# Patient Record
Sex: FEMALE | Race: BLACK OR AFRICAN AMERICAN | NOT HISPANIC OR LATINO | URBAN - METROPOLITAN AREA
[De-identification: names, ages, dates, MRNs, and addresses within clinical notes are randomized per-mention and may not be internally consistent; named-entity substitution may affect disease eponyms.]

---

## 2017-04-07 ENCOUNTER — HOSPITAL ENCOUNTER (EMERGENCY)
Facility: HOSPITAL | Age: 22
Discharge: HOME | End: 2017-04-07
Attending: EMERGENCY MEDICINE
Payer: MEDICAID

## 2017-04-07 VITALS
DIASTOLIC BLOOD PRESSURE: 69 MMHG | RESPIRATION RATE: 18 BRPM | SYSTOLIC BLOOD PRESSURE: 129 MMHG | OXYGEN SATURATION: 100 % | BODY MASS INDEX: 27.32 KG/M2 | HEART RATE: 93 BPM | HEIGHT: 66 IN | WEIGHT: 170 LBS | TEMPERATURE: 98 F

## 2017-04-07 DIAGNOSIS — M79.673 FOOT PAIN: ICD-10-CM

## 2017-04-07 DIAGNOSIS — S93.601A FOOT SPRAIN, RIGHT, INITIAL ENCOUNTER: Primary | ICD-10-CM

## 2017-04-07 DIAGNOSIS — R55 NEAR SYNCOPE: ICD-10-CM

## 2017-04-07 LAB
ANION GAP SERPL CALC-SCNC: 10 MMOL/L
BASOPHILS # BLD AUTO: 0.01 K/UL
BASOPHILS NFR BLD: 0.1 %
BUN SERPL-MCNC: 12 MG/DL
CALCIUM SERPL-MCNC: 9.6 MG/DL
CHLORIDE SERPL-SCNC: 105 MMOL/L
CO2 SERPL-SCNC: 23 MMOL/L
CREAT SERPL-MCNC: 0.9 MG/DL
DIFFERENTIAL METHOD: ABNORMAL
EOSINOPHIL # BLD AUTO: 0 K/UL
EOSINOPHIL NFR BLD: 0 %
ERYTHROCYTE [DISTWIDTH] IN BLOOD BY AUTOMATED COUNT: 13.2 %
EST. GFR  (AFRICAN AMERICAN): >60 ML/MIN/1.73 M^2
EST. GFR  (NON AFRICAN AMERICAN): >60 ML/MIN/1.73 M^2
GLUCOSE SERPL-MCNC: 127 MG/DL
HCT VFR BLD AUTO: 36.4 %
HGB BLD-MCNC: 12.3 G/DL
LYMPHOCYTES # BLD AUTO: 0.5 K/UL
LYMPHOCYTES NFR BLD: 4 %
MCH RBC QN AUTO: 30.1 PG
MCHC RBC AUTO-ENTMCNC: 33.8 %
MCV RBC AUTO: 89 FL
MONOCYTES # BLD AUTO: 0.5 K/UL
MONOCYTES NFR BLD: 3.6 %
NEUTROPHILS # BLD AUTO: 11.7 K/UL
NEUTROPHILS NFR BLD: 92.3 %
PLATELET # BLD AUTO: 275 K/UL
PMV BLD AUTO: 10.4 FL
POTASSIUM SERPL-SCNC: 4.2 MMOL/L
RBC # BLD AUTO: 4.08 M/UL
SODIUM SERPL-SCNC: 138 MMOL/L
WBC # BLD AUTO: 12.63 K/UL

## 2017-04-07 PROCEDURE — 85025 COMPLETE CBC W/AUTO DIFF WBC: CPT

## 2017-04-07 PROCEDURE — 99284 EMERGENCY DEPT VISIT MOD MDM: CPT

## 2017-04-07 PROCEDURE — 93010 ELECTROCARDIOGRAM REPORT: CPT | Mod: ,,, | Performed by: INTERNAL MEDICINE

## 2017-04-07 PROCEDURE — 80048 BASIC METABOLIC PNL TOTAL CA: CPT

## 2017-04-07 NOTE — ED AVS SNAPSHOT
[facility]  [id number] [place] [id number]               Jose Angel Mccauley   2017  9:07 PM   ED    Description:  Female : 1995   Department:  [facility]           Your Care was Coordinated By:     Provider Role From To    Wilfrid Ivy MD Attending Provider [phone] --      Reason for Visit     Foot Pain           Diagnoses this Visit        Comments    Foot sprain, right, initial encounter    -  Primary     Foot pain         Near syncope           ED Disposition     ED Disposition Condition Comment    Discharge             To Do List           Follow-up Information     Follow up with [facility] In 1 day.    Specialty:  Emergency Medicine    Why:  If symptoms worsen.  Patient should return to the ED for any concerns or worsening of symptoms..     Contact information:    [address] [place] [id number] [phone]      [facility]     [facility] Nurse Care Line - 24/ Assistance  Unless otherwise directed by your provider, please contact [facility], our nurse care line that is available for  assistance.     Registered nurses in the [facility] provide: appointment scheduling, clinical advisement, health education, and other advisory services.  Call: [phone] (toll free)               Medications           Message regarding Medications     Verify the changes and/or additions to your medication regime listed below are the same as discussed with your clinician today.  If any of these changes or additions are incorrect, please notify your healthcare provider.             Verify that the below list of medications is an accurate representation of the medications you are currently taking.  If none reported, the list may be blank. If incorrect, please contact your healthcare provider. Carry this list with you in case of emergency.                Clinical  "Reference Information           Your Vitals Were     BP Pulse Temp Resp Height Weight    129/69 (BP Location: Right arm, Patient Position: Sitting) 93 97.9 °F (36.6 °C) (Oral) 18 5' 6" (1.676 m) 77.1 kg (170 lb)    SpO2 BMI             100% 27.44 kg/m2         Allergies as of 4/7/2017     No Known Allergies      Immunizations Administered on Date of Encounter - 4/7/2017     None      ED Micro, Lab, POCT     Start Ordered       Status Ordering Provider    04/07/17 2114 04/07/17 2115  CBC auto differential  STAT      Final result     04/07/17 2114 04/07/17 2115  Basic metabolic panel  STAT      Final result       ED Imaging Orders     Start Ordered       Status Ordering Provider    04/07/17 2115 04/07/17 2115  X-Ray Foot Complete Right  1 time imaging      Final result       Discharge References/Attachments     FOOT SPRAIN (ENGLISH)      [facility] Sign-Up     Activating your [facility] account is as easy as 1-2-3!     1) Visit [email or web address], select Sign Up Now, enter this activation code and your date of birth, then select Next.  [email or web address]  Expires: 5/22/2017 10:24 PM      2) Create a username and password to use when you visit [facility] in the future and select a security question in case you lose your password and select Next.    3) Enter your e-mail address and click Sign Up!    Additional Information  If you have questions, please e-mail [email or web address] or call [phone] to talk to our [facility] staff. Remember, [facility] is NOT to be used for urgent needs. For medical emergencies, dial 911.         Smoking Cessation     If you would like to quit smoking:   You may be eligible for free services if you are a [place] resident and started smoking cigarettes before September 1, 1988.  Call the [employer]oking [employer]essation [employer] (SCT) toll free at (360) 335-1295 or (701) 103-0263.   Call 3-800-QUIT-NOW if you do not meet the above criteria.   Contact us via email: [email or web address]   View our website " for more information: [email or web address]         [facility] complies with applicable Federal civil rights laws and does not discriminate on the basis of race, color, national origin, age, disability, or sex.        Language Assistance Services     ATTENTION: Language assistance services are available, free of charge. Please call [phone].      ATENCIÓN: Si habla español, tiene a vidales disposición servicios gratuitos de asistencia lingüística. Llame al [phone].     CHÚ Ý: N?u b?n nói Ti?ng Vi?t, có các d?ch v? h? tr? ngôn ng? mi?n phí dành cho b?n. G?i s? [phone].

## 2017-04-08 NOTE — ED PROVIDER NOTES
SCRIBE #1 NOTE: I, Xuan Meka, am scribing for, and in the presence of, Wilfrid Ivy MD. I have scribed the entire note.      History      Chief Complaint   Patient presents with    Foot Pain     martinez foot pain x today.  Pt states fell down 1 step today.  + abrasions to L foot dorsal surface. Pt reports R foot swelling, discomfort.       Review of patient's allergies indicates:  Allergies not on file     HPI   HPI    4/7/2017, 9:10 PM   History obtained from the patient      History of Present Illness: Jose Angel Mccauley is a 22 y.o. female patient who is 4 weeks pregnant presents to the Emergency Department for R foot pain which onset gradually today. Pt reports twisting her ankle and falling. Symptoms are constant and moderate in severity. No mitigating or exacerbating factors reported. Associated sxs include R foot swelling and near syncopal episode after falling. Patient denies any fever, chills, HA, head injury, lightheadedness, vaginal bleeding, R ankle pain, N/V, and all other sxs at this time. No further complaints or concerns at this time. Pt reports having an OB and a scheduled appointment.    Arrival mode: Personal vehicle      PCP: No primary care provider on file.       Past Medical History:  History reviewed. No pertinent past medical history.    Past Surgical History:  No past surgical history on file.      Family History:  History reviewed. No pertinent family history.    Social History:  Social History     Social History Main Topics    Smoking status: Unknown    Smokeless tobacco: Unknown    Alcohol use Unknown    Drug use: Unknown    Sexual activity: Unknown       ROS   Review of Systems   Constitutional: Negative for chills and fever.   HENT: Negative for sore throat.         (-) head injury   Respiratory: Negative for shortness of breath.    Cardiovascular: Negative for chest pain.   Gastrointestinal: Negative for nausea and vomiting.   Genitourinary: Negative for dysuria and vaginal  bleeding.   Musculoskeletal: Negative for back pain.        (+) R foot pain, swelling  (-) R ankle pain   Skin: Negative for rash.   Neurological: Positive for syncope (near). Negative for weakness, light-headedness and headaches.   Hematological: Does not bruise/bleed easily.   All other systems reviewed and are negative.      Physical Exam    Initial Vitals   BP Pulse Resp Temp SpO2   04/07/17 2045 04/07/17 2045 04/07/17 2045 04/07/17 2045 04/07/17 2045   129/69 93 18 97.9 °F (36.6 °C) 100 %      Physical Exam  Nursing Notes and Vital Signs Reviewed.  Constitutional: Patient is in no acute distress. Awake and alert. Well-developed and well-nourished.  Head: Atraumatic. Normocephalic.  Eyes: PERRL. EOM intact. Conjunctivae are not pale. No scleral icterus.  ENT: Mucous membranes are moist. Oropharynx is clear and symmetric.    Neck: Supple. Full ROM. No lymphadenopathy.  Cardiovascular: Regular rate. Regular rhythm. No murmurs, rubs, or gallops. Distal pulses are 2+ and symmetric.  Pulmonary/Chest: No respiratory distress. Clear to auscultation bilaterally. No wheezing, rales, or rhonchi.  Abdominal: Soft and non-distended.  There is no tenderness.  No rebound, guarding, or rigidity. Good bowel sounds.  Genitourinary: No CVA tenderness  Musculoskeletal: Moves all extremities. No obvious deformities. No edema. No calf tenderness.  Right Ankle:  No obvious deformity. There is swelling.  There is tenderness to dorsal, medial, and lateral aspect of R foot.  No bony tenderness over navicular.  No tenderness over the base of the 5th metatarsal.  No proximal fibular tenderness. Ankle dorsiflexion and ankle plantar flexion are intact.  Intact sensation to light touch. Distal capillary refill takes less than 2 seconds.  PT and DP pulses are 2+ bilaterally.  Skin: Warm and dry.  Neurological: Patient is alert and oriented to person, place and time. Pupils ERRL and EOM normal. Cranial nerves II-XII are intact. Strength is  "full bilaterally; it is equal and 5/5 in bilateral upper and lower extremities. There is no pronator drift of outstretched arms. Light touch sense is intact. Speech is clear and normal. No acute focal neurological deficits noted.  Psychiatric: Normal affect. Good eye contact. Appropriate in content.    ED Course    Procedures  ED Vital Signs:  Vitals:    04/07/17 2045   BP: 129/69   Pulse: 93   Resp: 18   Temp: 97.9 °F (36.6 °C)   TempSrc: Oral   SpO2: 100%   Weight: 77.1 kg (170 lb)   Height: 5' 6" (1.676 m)       Abnormal Lab Results:  Labs Reviewed   CBC W/ AUTO DIFFERENTIAL - Abnormal; Notable for the following:        Result Value    Hematocrit 36.4 (*)     Gran # 11.7 (*)     Lymph # 0.5 (*)     Gran% 92.3 (*)     Lymph% 4.0 (*)     Mono% 3.6 (*)     All other components within normal limits   BASIC METABOLIC PANEL - Abnormal; Notable for the following:     Glucose 127 (*)     All other components within normal limits        All Lab Results:  Results for orders placed or performed during the hospital encounter of 04/07/17   CBC auto differential   Result Value Ref Range    WBC 12.63 3.90 - 12.70 K/uL    RBC 4.08 4.00 - 5.40 M/uL    Hemoglobin 12.3 12.0 - 16.0 g/dL    Hematocrit 36.4 (L) 37.0 - 48.5 %    MCV 89 82 - 98 fL    MCH 30.1 27.0 - 31.0 pg    MCHC 33.8 32.0 - 36.0 %    RDW 13.2 11.5 - 14.5 %    Platelets 275 150 - 350 K/uL    MPV 10.4 9.2 - 12.9 fL    Gran # 11.7 (H) 1.8 - 7.7 K/uL    Lymph # 0.5 (L) 1.0 - 4.8 K/uL    Mono # 0.5 0.3 - 1.0 K/uL    Eos # 0.0 0.0 - 0.5 K/uL    Baso # 0.01 0.00 - 0.20 K/uL    Gran% 92.3 (H) 38.0 - 73.0 %    Lymph% 4.0 (L) 18.0 - 48.0 %    Mono% 3.6 (L) 4.0 - 15.0 %    Eosinophil% 0.0 0.0 - 8.0 %    Basophil% 0.1 0.0 - 1.9 %    Differential Method Automated    Basic metabolic panel   Result Value Ref Range    Sodium 138 136 - 145 mmol/L    Potassium 4.2 3.5 - 5.1 mmol/L    Chloride 105 95 - 110 mmol/L    CO2 23 23 - 29 mmol/L    Glucose 127 (H) 70 - 110 mg/dL    BUN, Bld 12 " 6 - 20 mg/dL    Creatinine 0.9 0.5 - 1.4 mg/dL    Calcium 9.6 8.7 - 10.5 mg/dL    Anion Gap 10 8 - 16 mmol/L    eGFR if African American >60 >60 mL/min/1.73 m^2    eGFR if non African American >60 >60 mL/min/1.73 m^2       Imaging Results:  Imaging Results         X-Ray Foot Complete Right (Final result) Result time:  04/07/17 21:39:41    Final result by Jayden Hall Jr., MD (04/07/17 21:39:41)    Impression:     No acute abnormality identified in the right foot.      Electronically signed by: JAYDEN HALL M.D.  Date:     04/07/17  Time:    21:39     Narrative:    XR FOOT COMPLETE 3 VIEW RIGHT    Clinical history: Right foot pain.    Findings: No fracture is identified.  Joint alignment is anatomic.  Joint spaces are well maintained.             The EKG was ordered, reviewed, and independently interpreted by the ED provider.  Interpretation time: 2122  Rate: 96 BPM  Rhythm: normal sinus rhythm  Interpretation: QRS normal, T wave normal, ST segment normal, axis normal. No STEMI.         The Emergency Provider reviewed the vital signs and test results, which are outlined above.    ED Discussion     10:23 PM: Reassessed pt at this time. Pt is awake, alert, and in NAD. Pt remains neurologically intact. Discussed with pt all pertinent ED information and results. Discussed pt dx of right foot sprain and plan of tx. Gave pt all f/u and return to the ED instructions. All questions and concerns were addressed at this time. Pt expresses understanding of information and instructions, and is comfortable with plan to discharge. Pt is stable for discharge.  Pt reports having an OB and a scheduled appointment.    I discussed with patient and/or family/caretaker that evaluation in the ED does not suggest any emergent or life threatening medical conditions requiring immediate intervention beyond what was provided in the ED, and I believe patient is safe for discharge.  Regardless, an unremarkable evaluation in the ED does not  preclude the development or presence of a serious of life threatening condition. As such, patient was instructed to return immediately for any worsening or change in current symptoms.      ED Medication(s):  Medications - No data to display    New Prescriptions    No medications on file       Follow-up Information     Follow up with [facility] In 1 day.    Specialty:  Emergency Medicine    Why:  If symptoms worsen.  Patient should return to the ED for any concerns or worsening of symptoms..     Contact information:    [address] [facility] [address] [phone]            Medical Decision Making    Medical Decision Making:   Clinical Tests:   Lab Tests: Ordered and Reviewed  Radiological Study: Reviewed and Ordered  Medical Tests: Reviewed and Ordered           Scribe Attestation:   Scribe #1: I performed the above scribed service and the documentation accurately describes the services I performed. I attest to the accuracy of the note.    Attending:   Physician Attestation Statement for Scribe #1: I, Wilfrid Ivy MD, personally performed the services described in this documentation, as scribed by Xuan Ackerman, in my presence, and it is both accurate and complete.          Clinical Impression       ICD-10-CM ICD-9-CM   1. Foot sprain, right, initial encounter S93.601A 845.10   2. Foot pain M79.673 729.5   3. Near syncope R55 780.2       Disposition:   Disposition: Discharged  Condition: Stable         Wilfrid Ivy MD  04/08/17 0043

## 2017-04-08 NOTE — ED NOTES
Patient identifiers verified and correct for Lizluis Mccauley.    LOC: The patient is awake, alert and aware of environment with an appropriate affect, the patient is oriented x 3 and speaking appropriately.  APPEARANCE: Patient resting comfortably and in no acute distress, patient is clean and well groomed, patient's clothing is properly fastened.  SKIN: The skin is warm and dry, color consistent with ethnicity, patient has normal skin turgor and moist mucus membranes, skin intact, no breakdown or bruising noted.  MUSCULOSKELETAL: R foot pain s/p fall  RESPIRATORY: Airway is open and patent, respirations are spontaneous.  CARDIAC: Patient has a normal rate, no periphreal edema noted, capillary refill < 3 seconds.  ABDOMEN: Soft and non tender to palpation.    Pt stated she had syncopal episode